# Patient Record
Sex: MALE | Race: OTHER
[De-identification: names, ages, dates, MRNs, and addresses within clinical notes are randomized per-mention and may not be internally consistent; named-entity substitution may affect disease eponyms.]

---

## 2023-03-31 ENCOUNTER — HOSPITAL ENCOUNTER (EMERGENCY)
Dept: HOSPITAL 15 - ER | Age: 28
Discharge: HOME | End: 2023-03-31
Payer: COMMERCIAL

## 2023-03-31 VITALS — DIASTOLIC BLOOD PRESSURE: 87 MMHG | SYSTOLIC BLOOD PRESSURE: 127 MMHG

## 2023-03-31 VITALS — BODY MASS INDEX: 39.38 KG/M2 | WEIGHT: 236.34 LBS | HEIGHT: 65 IN

## 2023-03-31 DIAGNOSIS — K52.9: Primary | ICD-10-CM

## 2023-03-31 LAB
ALBUMIN SERPL-MCNC: 3.4 G/DL (ref 3.4–5)
ALP SERPL-CCNC: 83 U/L (ref 45–117)
ALT SERPL-CCNC: 40 U/L (ref 16–61)
ANION GAP SERPL CALCULATED.3IONS-SCNC: 6 MMOL/L (ref 5–15)
BILIRUB SERPL-MCNC: 0.4 MG/DL (ref 0.2–1)
BUN SERPL-MCNC: 13 MG/DL (ref 7–18)
BUN/CREAT SERPL: 12.1 (ref 10–20)
CALCIUM SERPL-MCNC: 8.7 MG/DL (ref 8.5–10.1)
CHLORIDE SERPL-SCNC: 105 MMOL/L (ref 98–107)
CO2 SERPL-SCNC: 27 MMOL/L (ref 21–32)
GLUCOSE SERPL-MCNC: 120 MG/DL (ref 74–106)
HCT VFR BLD AUTO: 46.7 % (ref 41–53)
HGB BLD-MCNC: 15.5 G/DL (ref 13.5–17.5)
MCH RBC QN AUTO: 29.5 PG (ref 28–32)
MCV RBC AUTO: 88.8 FL (ref 80–100)
NRBC BLD QL AUTO: 0 %
POTASSIUM SERPL-SCNC: 3.7 MMOL/L (ref 3.5–5.1)
PROT SERPL-MCNC: 7.7 G/DL (ref 6.4–8.2)
SODIUM SERPL-SCNC: 138 MMOL/L (ref 136–145)

## 2023-03-31 PROCEDURE — 36415 COLL VENOUS BLD VENIPUNCTURE: CPT

## 2023-03-31 PROCEDURE — 80053 COMPREHEN METABOLIC PANEL: CPT

## 2023-03-31 PROCEDURE — 85025 COMPLETE CBC W/AUTO DIFF WBC: CPT

## 2023-03-31 PROCEDURE — 99283 EMERGENCY DEPT VISIT LOW MDM: CPT

## 2025-03-26 ENCOUNTER — HOSPITAL ENCOUNTER (EMERGENCY)
Dept: HOSPITAL 15 - ER | Age: 30
Discharge: HOME | End: 2025-03-26
Payer: COMMERCIAL

## 2025-03-26 VITALS
OXYGEN SATURATION: 98 % | SYSTOLIC BLOOD PRESSURE: 131 MMHG | RESPIRATION RATE: 18 BRPM | HEART RATE: 57 BPM | DIASTOLIC BLOOD PRESSURE: 81 MMHG

## 2025-03-26 VITALS — RESPIRATION RATE: 20 BRPM | OXYGEN SATURATION: 98 % | TEMPERATURE: 97.8 F | HEART RATE: 76 BPM

## 2025-03-26 VITALS — BODY MASS INDEX: 37.38 KG/M2 | HEIGHT: 66 IN | WEIGHT: 232.59 LBS

## 2025-03-26 DIAGNOSIS — Z79.899: ICD-10-CM

## 2025-03-26 DIAGNOSIS — K52.9: Primary | ICD-10-CM

## 2025-03-26 DIAGNOSIS — R53.1: ICD-10-CM

## 2025-03-26 DIAGNOSIS — R42: ICD-10-CM

## 2025-03-26 LAB
ANION GAP SERPL CALCULATED.3IONS-SCNC: 11 MMOL/L (ref 5–15)
BUN SERPL-MCNC: 8 MG/DL (ref 9–23)
BUN/CREAT SERPL: 8.2 (ref 10–20)
CALCIUM SERPL-MCNC: 10.1 MG/DL (ref 8.7–10.4)
CHLORIDE SERPL-SCNC: 107 MMOL/L (ref 98–107)
CO2 SERPL-SCNC: 23 MMOL/L (ref 20–31)
GLUCOSE SERPL-MCNC: 104 MG/DL (ref 74–106)
HCT VFR BLD AUTO: 48.8 % (ref 41–53)
HGB BLD-MCNC: 16.4 G/DL (ref 13.5–17.5)
MCH RBC QN AUTO: 30.5 PG (ref 28–32)
MCV RBC AUTO: 90.4 FL (ref 80–100)
NRBC BLD QL AUTO: 0 %
POTASSIUM SERPL-SCNC: 4 MMOL/L (ref 3.5–5.1)
SODIUM SERPL-SCNC: 141 MMOL/L (ref 136–145)

## 2025-03-26 PROCEDURE — 36415 COLL VENOUS BLD VENIPUNCTURE: CPT

## 2025-03-26 PROCEDURE — 80048 BASIC METABOLIC PNL TOTAL CA: CPT

## 2025-03-26 PROCEDURE — 80307 DRUG TEST PRSMV CHEM ANLYZR: CPT

## 2025-03-26 PROCEDURE — 85025 COMPLETE CBC W/AUTO DIFF WBC: CPT

## 2025-03-26 NOTE — ED.PDOC
History of Present Illness


HPI Comments


29M presents to the ER w/ no prior Hx associated to the c/c of ABD pain. Pt 

reports "I think I have pesticide poisoning", due from him having N/V, 

Dizziness, weakness and HA for the past 1 month. Pt states that he has gotten 3 

different types of shots from his PCP for his dizziness, HA and emesis. Pt notes

on having periumbilical ABD pain. Denies chills, fever, SOB, CP or no other 

associated symptoms, modifiers, recent injuries or sick contacts at this time.


Chief Complaint:  Abdominal Pain


Time Seen by MD:  14:25


Primary Care Provider:  CIPRIANO


Reviewed Notes:  Nurses Notes, Medications, Allergies


Allergies:  


Coded Allergies:  


     NO KNOWN ALLERGIES (Unverified , 3/31/23)


Home Meds


Active Scripts


Ondansetron (Zofran) 4 Mg Tab, 4 MG PO BID, #14 TAB


   Prov:GALILEO DEUTSCH         3/31/23


Dicyclomine Hcl (BENTYL CAPSULE) 10 Mg Cp, 20 MG PO BID, #30 CAP


   Prov:GALILEO DEUTSCH         3/31/23


Information Source:  Patient


Mode of Arrival:  Ambulatory


Severity:  Moderate


Timing:  Weeks


Duration:  Since onset


Prehospital treatment:  None





Past Medical History


PAST MEDICAL HISTORY:  Denies


Surgical History:  Denies all surgeries





Family History


Family History:  Reviewed,noncontributory to illness, Unknown





Social History


Smoker:  Non-Smoker


Alcohol:  Denies ETOH Use


Drugs:  Denies Drug Use


Lives In:  Home





Constitutional:  reports: weakness; denies: chills, diaphoresis, fatigue, fever,

malaise, sweats, others


EENTM:  denies: blurred vision, double vision, ear bleeding, ear discharge, ear 

drainage, ear pain, ear ringing, eye pain, eye redness, hearing loss, mouth 

pain, mouth swelling, nasal discharge, nose bleeding, nose congestion, nose 

pain, photophobia, tearing, throat pain, throat swelling, voice changes, others


Respiratory:  denies: cough, hemoptysis, orthopnea, SOB at rest, shortness of 

breath, SOB with excertion, stridor, wheezing, others


Cardiovascular:  denies: chest pain, dizzy spells, diaphoresis, Dyspnea on 

exertion, edema, irregular heart beat, left arm pain, lightheadedness, 

palpitations, PND, syncope, others


Gastrointestinal:  reports: nausea, vomiting; denies: abdomen distended, 

abdominal pain, blood streaked bowels, constipated, diarrhea, dysphagia, 

difficulty swallowing, hematemesis, melena, poor appetite, poor fluid intake, 

rectal bleeding, rectal pain, others


Genitourinary:  denies: burning, dysuria, flank pain, frequency, hematuria, 

incontinence, penile discharge, penile sore, pain, testicle pain, testicle 

swelling, urgency, others


Neurological:  reports: dizziness, headache; denies: fainting, left sided 

numbness, left sided weakness, numbness, paresthesia, pre-existing deficit, 

right sided numbness, right sided weakness, seizure, speech problems, tingling, 

tremors, weakness, others


Musculoskeletal:  denies: back pain, gout, joint pain, joint swelling, muscle 

pain, muscle stiffness, neck pain, others


Integumetry:  denies: bruises, change in color, change in hair/nails, dryness, 

laceration, lesions, lumps, rash, wounds, others


Allergic/Immunocompromised:  denies: Difficulty Healing, Frequent Infections, 

Hives, Itching, others


Hematologic/Lymphatic:  denies: anemia, blood clots, easy bleeding, easy 

bruising, swollen glands, others


Endocrine:  denies: excessive hunger, excessive sweating, excessive thirst, 

excessive urination, flushing, intolerance to cold, intolerance to heat, unexp

lained weight gain, unexplained weight loss, others


Psychiatric:  denies: anxiety, bipolar disorder, depression, hopeless, panic 

disorder, schizophrenia, sleepless, suicidal, others


All Other Systems:  Reviewed and Negative





Physical Exam


General Appearance:  Moderate Distress, Normal


HEENT:  Normal ENT Inspection, Pharynx Normal, TMs Normal


Neck:  Full Range of Motion, Non-Tender, Normal, Normal Inspection


Respiratory:  Chest Non-Tender, Lungs Clear, No Accessory Muscle Use, No 

Respiratory Distress, Normal Breath Sounds


Cardiovascular:  No Edema, No JVD, No Murmur, No Gallop, Normal Peripheral 

Pulses, Regular Rate/Rhythm


Breast Exam:  Deferred


Gastrointestinal:  No Organomegaly, Non Tender, No Pulsatile Mass, Normal Bowel 

Sounds, Soft


Genitalia:  Deferred


Pelvic:  Deferred


Rectal:  Deferred


Extremities:  No calf tenderness, Normal capillary refill, Normal inspection, 

Normal range of motion, Non-tender, No pedal edema


Musculoskeletal :  


   Apperance:  Normal


Neurologic:  Alert, CNs II-XII nml as Tested, No Motor Deficits, Normal Affect, 

Normal Mood, No Sensory Deficits


Cerebellar Function:  Normal


Reflexes:  Normal


Skin:  Dry, Normal Color, Warm


Peripheral Pulses:  3+ Radial (R), 3+ Radial (L)


Lymphatic:  No Adenopathy





Was a procedure done?


Was a procedure done?:  No





Differential Dx


Considerations may include:


Anemia


Electrolyte imbalance





X-Ray, Labs, Meds, VS





                                   Vital Signs








  Date Time  Temp Pulse Resp B/P (MAP) Pulse Ox O2 Delivery O2 Flow Rate FiO2


 


3/26/25 13:24 99.3 69 16 141/84 (103) 96   





 99.3       








                                       Lab








Test


 3/26/25


13:33 3/26/25


13:24 Range/Units


 


 


White Blood Count


 10.1 


 


 4.4-10.8


10^3/uL


 


Red Blood Count


 5.39 


 


 4.5-5.90


10^6/uL


 


Hemoglobin 16.4   13.5-17.5  g/dL


 


Hematocrit 48.8   41.0-53.0  %


 


Mean Corpuscular Volume 90.4   80.0-100.0  fL


 


Mean Corpuscular Hemoglobin 30.5   28.0-32.0  pg


 


Mean Corpuscular Hemoglobin


Concent 33.7 


 


 32.0-36.0  g/dL





 


Red Cell Distribution Width 14.2   11.8-14.3  %


 


Platelet Count


 332 


 


 140-450


10^3/uL


 


Mean Platelet Volume 8.3   6.9-10.8  fL


 


Neutrophils (%) (Auto) 74.2   37.0-80.0  %


 


Lymphocytes (%) (Auto) 17.1   10.0-50.0  %


 


Monocytes (%) (Auto) 6.9   0.0-12.0  %


 


Eosinophils (%) (Auto) 0.7   0.0-7.0  %


 


Basophils (%) (Auto) 1.1   0.0-2.0  %


 


Neutrophils # (Auto)


 7.5 


 


 1.6-8.6  10


^3/uL


 


Lymphocytes # (Auto)


 1.7 


 


 0.4-5.4  10


^3/uL


 


Monocytes # (Auto) 0.7   0-1.3  10 ^3/uL


 


Eosinophils # (Auto) 0.1   0-0.8  10 ^3/uL


 


Basophils # (Auto) 0.1   0-0.2  10 ^3/uL


 


Nucleated Red Blood Cells 0.0    %


 


Sodium Level 141   136-145  mmol/L


 


Potassium Level 4.0   3.5-5.1  mmol/L


 


Chloride Level 107     mmol/L


 


Carbon Dioxide Level 23   20-31  mmol/L


 


Anion Gap 11   5-15  


 


Blood Urea Nitrogen 8 L  9-23  mg/dL


 


Creatinine


 0.98 


 


 0.700-1.30


mg/dL


 


Glomerular Filtration Rate


Calc 106 


 


 >90  mL/min





 


BUN/Creatinine Ratio 8.2 L  10.0-20.0  


 


Serum Glucose 104     mg/dL


 


Calcium Level 10.1   8.7-10.4  mg/dL


 


Urine Opiates Screen  Neg  NEGATIVE  


 


Urine Fentanyl Screen  Neg  NEGATIVE  


 


Urine Barbiturates Screen  Neg  NEGATIVE  


 


Urine Phencyclidine Screen  Neg  NEGATIVE  


 


Urine Amphetamines Screen  Neg  NEGATIVE  


 


Urine Benzodiazepines Screen  Neg  NEGATIVE  


 


Urine Cocaine Screen  Neg  NEGATIVE  


 


Urine Cannabinoids Screen  Pos  NEGATIVE  





Patient alert.


Complaining of generalized symptoms.


Vitals stable.


Answering questions.  


Urinalysis shows marijuana.  


WBC within normal limits.  


Hemoglobin within normal limits.  


Abdomen is soft nontender.  


No sign of distress.  


CT of the abdomen was not order because physical examination was pristine.


Good muscle strength.


No sign of sepsis.


Explained to the patient.  


Was told to follow up with his primary care physician.  


Was told to come back if there is any problem.


Time of 1ST Reevaluation:  14:55


Reevaluation 1ST:  Improved


Patient Education/Counseling:  Diagnosis, Treatment, Prognosis


Family Education/Counseling:  No Family Present





Departure 1


Departure


Time of Disposition:  14:45


Impression:  


   Primary Impression:  


   Acute gastroenteritis


Disposition:  01 HOME / SELF CARE / HOMELESS


Condition:  Good


Discharged With:  Self





Critical Care Note


Critical Care Time?:  No





Stability


Stability form required:  No





Heart Score


Heart Score:  








Heart Score Response (Comments) Value


 


History N/A 0


 


EKG N/A 0


 


Age N/A 0


 


Risk Factors N/A 0


 


Troponin N/A 0


 


Total  0














I personally scribed for SEBASTIAN DOWD MD ([email or web address]) on 3/26/25 at 14:43.  

Electronically submitted by Mao Barrett ([email or web address]).





SEBASTIAN DOWD MD           Mar 26, 2025 14:43